# Patient Record
Sex: FEMALE | Race: WHITE | HISPANIC OR LATINO | Employment: UNEMPLOYED | URBAN - METROPOLITAN AREA
[De-identification: names, ages, dates, MRNs, and addresses within clinical notes are randomized per-mention and may not be internally consistent; named-entity substitution may affect disease eponyms.]

---

## 2024-08-28 ENCOUNTER — OFFICE VISIT (OUTPATIENT)
Dept: FAMILY MEDICINE | Facility: CLINIC | Age: 29
End: 2024-08-28
Payer: COMMERCIAL

## 2024-08-28 VITALS
HEART RATE: 77 BPM | BODY MASS INDEX: 32.94 KG/M2 | DIASTOLIC BLOOD PRESSURE: 87 MMHG | SYSTOLIC BLOOD PRESSURE: 124 MMHG | HEIGHT: 62 IN | WEIGHT: 179 LBS | RESPIRATION RATE: 22 BRPM | TEMPERATURE: 97.4 F | OXYGEN SATURATION: 98 %

## 2024-08-28 DIAGNOSIS — Z30.09 COUNSELING FOR BIRTH CONTROL REGARDING INTRAUTERINE DEVICE (IUD): Primary | ICD-10-CM

## 2024-08-28 PROCEDURE — 99203 OFFICE O/P NEW LOW 30 MIN: CPT | Performed by: FAMILY MEDICINE

## 2024-08-28 NOTE — PROGRESS NOTES
"  Assessment & Plan     Counseling for birth control regarding intrauterine device (IUD)  Patient will return for placement of paragard after 6 weeks postpartum, plans not to resume intercourse until after placement          BMI  Estimated body mass index is 32.52 kg/m  as calculated from the following:    Height as of this encounter: 1.58 m (5' 2.21\").    Weight as of this encounter: 81.2 kg (179 lb).             No follow-ups on file.    Nicole Lara is a 29 year old, presenting for the following health issues:  Contraception (Interest in the copper one)      2024     3:47 PM   Additional Questions   Roomed by Dieudonne   Accompanied by ALBERTINA         2024    Information    services provided? Yes   Language Danish   Type of interpretation provided Face-to-face    name Isatu    ID ALBERTINA    Agency [employer]    phone number [phone]        HPI     Would like to have IUD, has had a paragard in the past.     Has had two kids, younger baby born [birth year], older son born in [facility] . Would like one more, but not for a while.                   Objective    /87   Pulse 77   Temp 97.4  F (36.3  C)   Resp 22   Ht 1.58 m (5' 2.21\")   Wt 81.2 kg (179 lb)   LMP  (LMP Unknown)   SpO2 98%   BMI 32.52 kg/m    Body mass index is 32.52 kg/m .  Physical Exam       GEN: Well appearing, no distress  HENT: Eomi, no conjunctival injection, normocephalic  PULM: Unlabored breathing  SKIN: No visible rash  PSYCH: Affect bright, appropriate to content    History reviewed. No pertinent past medical history.   Past Surgical History:   Procedure Laterality Date     SECTION  2024     Family History   Problem Relation Age of Onset    Hypertension Mother                  Signed Electronically by: Shira Wilkerson MD    "

## 2024-10-07 ENCOUNTER — OFFICE VISIT (OUTPATIENT)
Dept: FAMILY MEDICINE | Facility: CLINIC | Age: 29
End: 2024-10-07
Payer: COMMERCIAL

## 2024-10-07 VITALS
RESPIRATION RATE: 20 BRPM | DIASTOLIC BLOOD PRESSURE: 82 MMHG | TEMPERATURE: 98.4 F | SYSTOLIC BLOOD PRESSURE: 119 MMHG | HEIGHT: 61 IN | OXYGEN SATURATION: 98 % | HEART RATE: 82 BPM | BODY MASS INDEX: 35.68 KG/M2 | WEIGHT: 189 LBS

## 2024-10-07 DIAGNOSIS — Z11.3 SCREENING EXAMINATION FOR STI: ICD-10-CM

## 2024-10-07 DIAGNOSIS — Z30.430 ENCOUNTER FOR INSERTION OF INTRAUTERINE CONTRACEPTIVE DEVICE: Primary | ICD-10-CM

## 2024-10-07 LAB — HCG UR QL: NEGATIVE

## 2024-10-07 PROCEDURE — 90471 IMMUNIZATION ADMIN: CPT | Performed by: FAMILY MEDICINE

## 2024-10-07 PROCEDURE — 81025 URINE PREGNANCY TEST: CPT | Performed by: FAMILY MEDICINE

## 2024-10-07 PROCEDURE — 87491 CHLMYD TRACH DNA AMP PROBE: CPT | Performed by: FAMILY MEDICINE

## 2024-10-07 PROCEDURE — 99207 PR DROP WITH A PROCEDURE: CPT | Performed by: FAMILY MEDICINE

## 2024-10-07 PROCEDURE — 58300 INSERT INTRAUTERINE DEVICE: CPT | Performed by: FAMILY MEDICINE

## 2024-10-07 PROCEDURE — 90656 IIV3 VACC NO PRSV 0.5 ML IM: CPT | Performed by: FAMILY MEDICINE

## 2024-10-07 PROCEDURE — 87591 N.GONORRHOEAE DNA AMP PROB: CPT | Performed by: FAMILY MEDICINE

## 2024-10-07 RX ORDER — COPPER 313.4 MG/1
1 INTRAUTERINE DEVICE INTRAUTERINE ONCE
COMMUNITY

## 2024-10-07 RX ORDER — COPPER 313.4 MG/1
1 INTRAUTERINE DEVICE INTRAUTERINE ONCE
Status: COMPLETED
Start: 2024-10-07 | End: 2024-10-07

## 2024-10-07 RX ADMIN — COPPER 1 EACH: 313.4 INTRAUTERINE DEVICE INTRAUTERINE at 17:23

## 2024-10-07 NOTE — PROGRESS NOTES
"IUD Insertion:  CONSULT:    Is a pregnancy test required: No.  Was a consent obtained?  Yes    Subjective: Jane Rojas is a 29 year old  presents for IUD and desires Paragard type IUD.    Patient has been given the opportunity to ask questions about all forms of birth control, including all options appropriate for Jane Rojas. Discussed that no method of birth control, except abstinence is 100% effective against pregnancy or sexually transmitted infection.     Jane Rojas understands she may have the IUD removed at any time. IUD should be removed by a health care provider.    The entire insertion procedure was reviewed with the patient, including care after placement.    No LMP recorded (lmp unknown). Last sexual activity: Has not returned to sexual activity since her birth, she is 8 wk pp . No allergy to betadine or shellfish. Patient desires STD screening  hCG Urine Qualitative   Date Value Ref Range Status   10/07/2024 Negative Negative Final     Comment:     This test is for screening purposes.  Results should be interpreted along with the clinical picture.  Confirmation testing is available if warranted by ordering [id number], HCG Quantitative Pregnancy.         /82   Pulse 82   Temp 98.4  F (36.9  C)   Resp 20   Ht 1.537 m (5' 0.5\")   Wt 85.7 kg (189 lb)   LMP  (LMP Unknown)   SpO2 98%   BMI 36.30 kg/m      Pelvic Exam:   EG/BUS: normal genital architecture without lesions, erythema or abnormal secretions.   Vagina: moist, pink, rugae with physiologic discharge and secretions  Cervix: Multiparous parous no lesions and pink, moist, closed, without lesion or CMT  Uterus: anteverted position, mobile, no pain    PROCEDURE NOTE: -- IUD Insertion    Reason for Insertion: contraception    Premedicated with ibuprofen.  Under sterile technique, cervix was visualized with speculum and prepped with Betadine solution swab x 3. Tenaculum was placed for stability. The uterus was gently " straightened and sounded to 7.0 cm. The sound passed very easily. IUD prepared for placement, and IUD inserted according to 's instructions without difficulty or significant resitance, and deployed at the fundus. The strings were visualized and trimmed to 4.0 cm from the external os. Tenaculum was removed and hemostasis noted. Speculum removed.  Patient tolerated procedure well.    EBL: minimal    Complications: none    ASSESSMENT:     ICD-10-CM    1. Screening for HIV (human immunodeficiency virus)  Z11.4       2. Need for hepatitis C screening test  Z11.59       3. Cervical cancer screening  Z12.4       4. Contraceptive management  Z30.9 HCG qualitative urine     HCG qualitative urine      5. Encounter for insertion of intrauterine contraceptive device  Z30.430 paragard intrauterine copper IUD device 1 each     INSERTION INTRAUTERINE DEVICE             PLAN:    Given 's handouts, including when to have IUD removed, list of danger s/sx, side effects and follow up recommended. Encouraged condom use for prevention of STD. Back up contraception advised for 7 days if progestin method. Advised to call for any fever, for prolonged or severe pain or bleeding, abnormal vaginal discharge, or unable to palpate strings. She was advised to use pain medications (ibuprofen) as needed for mild to moderate pain. Advised to follow-up in clinic in 4-6 weeks for IUD string check if unable to find strings or as directed by provider.     Shira Wilkerson MD

## 2024-10-09 LAB
C TRACH DNA SPEC QL PROBE+SIG AMP: NEGATIVE
N GONORRHOEA DNA SPEC QL NAA+PROBE: NEGATIVE

## 2025-02-25 ENCOUNTER — APPOINTMENT (OUTPATIENT)
Dept: CT IMAGING | Facility: HOSPITAL | Age: 30
End: 2025-02-25
Attending: STUDENT IN AN ORGANIZED HEALTH CARE EDUCATION/TRAINING PROGRAM
Payer: COMMERCIAL

## 2025-02-25 ENCOUNTER — ANESTHESIA (OUTPATIENT)
Dept: SURGERY | Facility: HOSPITAL | Age: 30
End: 2025-02-25
Payer: COMMERCIAL

## 2025-02-25 ENCOUNTER — VIRTUAL VISIT (OUTPATIENT)
Dept: INTERPRETER SERVICES | Facility: CLINIC | Age: 30
End: 2025-02-25

## 2025-02-25 ENCOUNTER — ANESTHESIA EVENT (OUTPATIENT)
Dept: SURGERY | Facility: HOSPITAL | Age: 30
End: 2025-02-25
Payer: COMMERCIAL

## 2025-02-25 ENCOUNTER — HOSPITAL ENCOUNTER (EMERGENCY)
Facility: HOSPITAL | Age: 30
Discharge: HOME | End: 2025-02-25
Attending: STUDENT IN AN ORGANIZED HEALTH CARE EDUCATION/TRAINING PROGRAM
Payer: COMMERCIAL

## 2025-02-25 VITALS
HEART RATE: 74 BPM | BODY MASS INDEX: 41.54 KG/M2 | HEIGHT: 61 IN | SYSTOLIC BLOOD PRESSURE: 115 MMHG | DIASTOLIC BLOOD PRESSURE: 63 MMHG | WEIGHT: 220 LBS | RESPIRATION RATE: 14 BRPM | TEMPERATURE: 97.5 F | OXYGEN SATURATION: 96 %

## 2025-02-25 DIAGNOSIS — K35.30 ACUTE APPENDICITIS WITH LOCALIZED PERITONITIS, WITHOUT PERFORATION, ABSCESS, OR GANGRENE: Primary | ICD-10-CM

## 2025-02-25 LAB
ALBUMIN SERPL BCG-MCNC: 4.5 G/DL (ref 3.5–5.2)
ALP SERPL-CCNC: 132 U/L (ref 40–150)
ALT SERPL W P-5'-P-CCNC: 23 U/L (ref 0–50)
ANION GAP SERPL CALCULATED.3IONS-SCNC: 12 MMOL/L (ref 7–15)
AST SERPL W P-5'-P-CCNC: 21 U/L (ref 0–45)
BASOPHILS # BLD AUTO: 0 10E3/UL (ref 0–0.2)
BASOPHILS NFR BLD AUTO: 0 %
BILIRUB DIRECT SERPL-MCNC: 0.08 MG/DL (ref 0–0.3)
BILIRUB SERPL-MCNC: 0.3 MG/DL
BUN SERPL-MCNC: 10.3 MG/DL (ref 6–20)
CALCIUM SERPL-MCNC: 9.9 MG/DL (ref 8.8–10.4)
CHLORIDE SERPL-SCNC: 105 MMOL/L (ref 98–107)
CREAT SERPL-MCNC: 0.62 MG/DL (ref 0.51–0.95)
EGFRCR SERPLBLD CKD-EPI 2021: >90 ML/MIN/1.73M2
EOSINOPHIL # BLD AUTO: 0.1 10E3/UL (ref 0–0.7)
EOSINOPHIL NFR BLD AUTO: 1 %
ERYTHROCYTE [DISTWIDTH] IN BLOOD BY AUTOMATED COUNT: 14.1 % (ref 10–15)
GLUCOSE SERPL-MCNC: 120 MG/DL (ref 70–99)
HCG SERPL QL: NEGATIVE
HCO3 SERPL-SCNC: 25 MMOL/L (ref 22–29)
HCT VFR BLD AUTO: 42.5 % (ref 35–47)
HGB BLD-MCNC: 13.6 G/DL (ref 11.7–15.7)
HOLD SPECIMEN: NORMAL
IMM GRANULOCYTES # BLD: 0.1 10E3/UL
IMM GRANULOCYTES NFR BLD: 0 %
LIPASE SERPL-CCNC: 23 U/L (ref 13–60)
LYMPHOCYTES # BLD AUTO: 3.1 10E3/UL (ref 0.8–5.3)
LYMPHOCYTES NFR BLD AUTO: 25 %
MCH RBC QN AUTO: 26.1 PG (ref 26.5–33)
MCHC RBC AUTO-ENTMCNC: 32 G/DL (ref 31.5–36.5)
MCV RBC AUTO: 81 FL (ref 78–100)
MONOCYTES # BLD AUTO: 0.5 10E3/UL (ref 0–1.3)
MONOCYTES NFR BLD AUTO: 4 %
NEUTROPHILS # BLD AUTO: 8.7 10E3/UL (ref 1.6–8.3)
NEUTROPHILS NFR BLD AUTO: 70 %
NRBC # BLD AUTO: 0 10E3/UL
NRBC BLD AUTO-RTO: 0 /100
PLATELET # BLD AUTO: 325 10E3/UL (ref 150–450)
POTASSIUM SERPL-SCNC: 3.6 MMOL/L (ref 3.4–5.3)
PROT SERPL-MCNC: 7.7 G/DL (ref 6.4–8.3)
RBC # BLD AUTO: 5.22 10E6/UL (ref 3.8–5.2)
SODIUM SERPL-SCNC: 142 MMOL/L (ref 135–145)
WBC # BLD AUTO: 12.5 10E3/UL (ref 4–11)

## 2025-02-25 PROCEDURE — 250N000011 HC RX IP 250 OP 636: Performed by: SURGERY

## 2025-02-25 PROCEDURE — 99207 PR APP CREDIT; MD BILLING SHARED VISIT: CPT

## 2025-02-25 PROCEDURE — 250N000013 HC RX MED GY IP 250 OP 250 PS 637: Performed by: ANESTHESIOLOGY

## 2025-02-25 PROCEDURE — 99285 EMERGENCY DEPT VISIT HI MDM: CPT | Mod: 25

## 2025-02-25 PROCEDURE — T1013 SIGN LANG/ORAL INTERPRETER: HCPCS | Mod: U4,TEL,95

## 2025-02-25 PROCEDURE — 250N000011 HC RX IP 250 OP 636: Performed by: ANESTHESIOLOGY

## 2025-02-25 PROCEDURE — 250N000011 HC RX IP 250 OP 636: Performed by: STUDENT IN AN ORGANIZED HEALTH CARE EDUCATION/TRAINING PROGRAM

## 2025-02-25 PROCEDURE — 250N000025 HC SEVOFLURANE, PER MIN: Performed by: SURGERY

## 2025-02-25 PROCEDURE — 99204 OFFICE O/P NEW MOD 45 MIN: CPT | Mod: FS | Performed by: SURGERY

## 2025-02-25 PROCEDURE — 250N000009 HC RX 250: Performed by: REGISTERED NURSE

## 2025-02-25 PROCEDURE — 360N000076 HC SURGERY LEVEL 3, PER MIN: Performed by: SURGERY

## 2025-02-25 PROCEDURE — 250N000013 HC RX MED GY IP 250 OP 250 PS 637: Performed by: STUDENT IN AN ORGANIZED HEALTH CARE EDUCATION/TRAINING PROGRAM

## 2025-02-25 PROCEDURE — 258N000003 HC RX IP 258 OP 636: Performed by: REGISTERED NURSE

## 2025-02-25 PROCEDURE — 80053 COMPREHEN METABOLIC PANEL: CPT | Performed by: STUDENT IN AN ORGANIZED HEALTH CARE EDUCATION/TRAINING PROGRAM

## 2025-02-25 PROCEDURE — 82248 BILIRUBIN DIRECT: CPT | Performed by: STUDENT IN AN ORGANIZED HEALTH CARE EDUCATION/TRAINING PROGRAM

## 2025-02-25 PROCEDURE — 44970 LAPAROSCOPY APPENDECTOMY: CPT | Performed by: SURGERY

## 2025-02-25 PROCEDURE — 710N000009 HC RECOVERY PHASE 1, LEVEL 1, PER MIN: Performed by: SURGERY

## 2025-02-25 PROCEDURE — 250N000009 HC RX 250: Performed by: ANESTHESIOLOGY

## 2025-02-25 PROCEDURE — 999N000141 HC STATISTIC PRE-PROCEDURE NURSING ASSESSMENT: Performed by: SURGERY

## 2025-02-25 PROCEDURE — 83690 ASSAY OF LIPASE: CPT | Performed by: STUDENT IN AN ORGANIZED HEALTH CARE EDUCATION/TRAINING PROGRAM

## 2025-02-25 PROCEDURE — 88304 TISSUE EXAM BY PATHOLOGIST: CPT | Mod: TC | Performed by: SURGERY

## 2025-02-25 PROCEDURE — 250N000011 HC RX IP 250 OP 636: Performed by: REGISTERED NURSE

## 2025-02-25 PROCEDURE — 85014 HEMATOCRIT: CPT | Performed by: STUDENT IN AN ORGANIZED HEALTH CARE EDUCATION/TRAINING PROGRAM

## 2025-02-25 PROCEDURE — 258N000003 HC RX IP 258 OP 636: Performed by: ANESTHESIOLOGY

## 2025-02-25 PROCEDURE — 96365 THER/PROPH/DIAG IV INF INIT: CPT | Mod: 59

## 2025-02-25 PROCEDURE — 44970 LAPAROSCOPY APPENDECTOMY: CPT | Mod: AS

## 2025-02-25 PROCEDURE — 85004 AUTOMATED DIFF WBC COUNT: CPT | Performed by: STUDENT IN AN ORGANIZED HEALTH CARE EDUCATION/TRAINING PROGRAM

## 2025-02-25 PROCEDURE — 88305 TISSUE EXAM BY PATHOLOGIST: CPT | Mod: TC | Performed by: SURGERY

## 2025-02-25 PROCEDURE — 84703 CHORIONIC GONADOTROPIN ASSAY: CPT | Performed by: STUDENT IN AN ORGANIZED HEALTH CARE EDUCATION/TRAINING PROGRAM

## 2025-02-25 PROCEDURE — 710N000012 HC RECOVERY PHASE 2, PER MINUTE: Performed by: SURGERY

## 2025-02-25 PROCEDURE — 272N000001 HC OR GENERAL SUPPLY STERILE: Performed by: SURGERY

## 2025-02-25 PROCEDURE — 250N000013 HC RX MED GY IP 250 OP 250 PS 637: Performed by: REGISTERED NURSE

## 2025-02-25 PROCEDURE — 370N000017 HC ANESTHESIA TECHNICAL FEE, PER MIN: Performed by: SURGERY

## 2025-02-25 PROCEDURE — 88304 TISSUE EXAM BY PATHOLOGIST: CPT | Mod: 26 | Performed by: PATHOLOGY

## 2025-02-25 PROCEDURE — 96375 TX/PRO/DX INJ NEW DRUG ADDON: CPT

## 2025-02-25 PROCEDURE — 36415 COLL VENOUS BLD VENIPUNCTURE: CPT | Performed by: STUDENT IN AN ORGANIZED HEALTH CARE EDUCATION/TRAINING PROGRAM

## 2025-02-25 PROCEDURE — 74177 CT ABD & PELVIS W/CONTRAST: CPT

## 2025-02-25 PROCEDURE — 250N000011 HC RX IP 250 OP 636: Mod: JW | Performed by: SURGERY

## 2025-02-25 RX ORDER — ONDANSETRON 2 MG/ML
4 INJECTION INTRAMUSCULAR; INTRAVENOUS EVERY 30 MIN PRN
Status: DISCONTINUED | OUTPATIENT
Start: 2025-02-25 | End: 2025-02-25 | Stop reason: HOSPADM

## 2025-02-25 RX ORDER — DOCUSATE SODIUM 100 MG/1
100 CAPSULE, LIQUID FILLED ORAL 2 TIMES DAILY
Qty: 30 CAPSULE | Refills: 0 | Status: SHIPPED | OUTPATIENT
Start: 2025-02-25

## 2025-02-25 RX ORDER — HYDROCODONE BITARTRATE AND ACETAMINOPHEN 5; 325 MG/1; MG/1
1-2 TABLET ORAL EVERY 4 HOURS PRN
Qty: 10 TABLET | Refills: 0 | Status: SHIPPED | OUTPATIENT
Start: 2025-02-25

## 2025-02-25 RX ORDER — KETOROLAC TROMETHAMINE 30 MG/ML
INJECTION, SOLUTION INTRAMUSCULAR; INTRAVENOUS PRN
Status: DISCONTINUED | OUTPATIENT
Start: 2025-02-25 | End: 2025-02-25

## 2025-02-25 RX ORDER — HYDROMORPHONE HCL IN WATER/PF 6 MG/30 ML
0.4 PATIENT CONTROLLED ANALGESIA SYRINGE INTRAVENOUS EVERY 5 MIN PRN
Status: DISCONTINUED | OUTPATIENT
Start: 2025-02-25 | End: 2025-02-25 | Stop reason: HOSPADM

## 2025-02-25 RX ORDER — ONDANSETRON 2 MG/ML
INJECTION INTRAMUSCULAR; INTRAVENOUS PRN
Status: DISCONTINUED | OUTPATIENT
Start: 2025-02-25 | End: 2025-02-25

## 2025-02-25 RX ORDER — SODIUM CHLORIDE, SODIUM LACTATE, POTASSIUM CHLORIDE, CALCIUM CHLORIDE 600; 310; 30; 20 MG/100ML; MG/100ML; MG/100ML; MG/100ML
INJECTION, SOLUTION INTRAVENOUS CONTINUOUS
Status: DISCONTINUED | OUTPATIENT
Start: 2025-02-25 | End: 2025-02-25 | Stop reason: HOSPADM

## 2025-02-25 RX ORDER — IOPAMIDOL 755 MG/ML
90 INJECTION, SOLUTION INTRAVASCULAR ONCE
Status: COMPLETED | OUTPATIENT
Start: 2025-02-25 | End: 2025-02-25

## 2025-02-25 RX ORDER — HYDROMORPHONE HCL IN WATER/PF 6 MG/30 ML
0.2 PATIENT CONTROLLED ANALGESIA SYRINGE INTRAVENOUS EVERY 5 MIN PRN
Status: DISCONTINUED | OUTPATIENT
Start: 2025-02-25 | End: 2025-02-25 | Stop reason: HOSPADM

## 2025-02-25 RX ORDER — DEXAMETHASONE SODIUM PHOSPHATE 10 MG/ML
INJECTION, SOLUTION INTRAMUSCULAR; INTRAVENOUS PRN
Status: DISCONTINUED | OUTPATIENT
Start: 2025-02-25 | End: 2025-02-25

## 2025-02-25 RX ORDER — ONDANSETRON 4 MG/1
4 TABLET, ORALLY DISINTEGRATING ORAL EVERY 30 MIN PRN
Status: DISCONTINUED | OUTPATIENT
Start: 2025-02-25 | End: 2025-02-25 | Stop reason: HOSPADM

## 2025-02-25 RX ORDER — DEXMEDETOMIDINE HYDROCHLORIDE 4 UG/ML
INJECTION, SOLUTION INTRAVENOUS
Status: DISCONTINUED
Start: 2025-02-25 | End: 2025-02-25 | Stop reason: HOSPADM

## 2025-02-25 RX ORDER — HYDROCODONE BITARTRATE AND ACETAMINOPHEN 5; 325 MG/1; MG/1
1 TABLET ORAL
Status: DISCONTINUED | OUTPATIENT
Start: 2025-02-25 | End: 2025-02-25 | Stop reason: HOSPADM

## 2025-02-25 RX ORDER — OXYCODONE HYDROCHLORIDE 5 MG/1
10 TABLET ORAL
Status: DISCONTINUED | OUTPATIENT
Start: 2025-02-25 | End: 2025-02-25 | Stop reason: HOSPADM

## 2025-02-25 RX ORDER — LIDOCAINE 40 MG/G
CREAM TOPICAL
Status: DISCONTINUED | OUTPATIENT
Start: 2025-02-25 | End: 2025-02-25 | Stop reason: HOSPADM

## 2025-02-25 RX ORDER — CEFAZOLIN SODIUM/WATER 2 G/20 ML
2 SYRINGE (ML) INTRAVENOUS
Status: COMPLETED | OUTPATIENT
Start: 2025-02-25 | End: 2025-02-25

## 2025-02-25 RX ORDER — FENTANYL CITRATE 50 UG/ML
INJECTION, SOLUTION INTRAMUSCULAR; INTRAVENOUS PRN
Status: DISCONTINUED | OUTPATIENT
Start: 2025-02-25 | End: 2025-02-25

## 2025-02-25 RX ORDER — ALBUTEROL SULFATE 90 UG/1
INHALANT RESPIRATORY (INHALATION) PRN
Status: DISCONTINUED | OUTPATIENT
Start: 2025-02-25 | End: 2025-02-25

## 2025-02-25 RX ORDER — DEXAMETHASONE SODIUM PHOSPHATE 4 MG/ML
4 INJECTION, SOLUTION INTRA-ARTICULAR; INTRALESIONAL; INTRAMUSCULAR; INTRAVENOUS; SOFT TISSUE
Status: DISCONTINUED | OUTPATIENT
Start: 2025-02-25 | End: 2025-02-25 | Stop reason: HOSPADM

## 2025-02-25 RX ORDER — PROPOFOL 10 MG/ML
INJECTION, EMULSION INTRAVENOUS PRN
Status: DISCONTINUED | OUTPATIENT
Start: 2025-02-25 | End: 2025-02-25

## 2025-02-25 RX ORDER — NALOXONE HYDROCHLORIDE 0.4 MG/ML
0.1 INJECTION, SOLUTION INTRAMUSCULAR; INTRAVENOUS; SUBCUTANEOUS
Status: DISCONTINUED | OUTPATIENT
Start: 2025-02-25 | End: 2025-02-25 | Stop reason: HOSPADM

## 2025-02-25 RX ORDER — SODIUM CHLORIDE, SODIUM LACTATE, POTASSIUM CHLORIDE, CALCIUM CHLORIDE 600; 310; 30; 20 MG/100ML; MG/100ML; MG/100ML; MG/100ML
INJECTION, SOLUTION INTRAVENOUS CONTINUOUS PRN
Status: DISCONTINUED | OUTPATIENT
Start: 2025-02-25 | End: 2025-02-25

## 2025-02-25 RX ORDER — OXYCODONE HYDROCHLORIDE 5 MG/1
5 TABLET ORAL
Status: COMPLETED | OUTPATIENT
Start: 2025-02-25 | End: 2025-02-25

## 2025-02-25 RX ORDER — BUPIVACAINE HYDROCHLORIDE 2.5 MG/ML
INJECTION, SOLUTION EPIDURAL; INFILTRATION; INTRACAUDAL PRN
Status: DISCONTINUED | OUTPATIENT
Start: 2025-02-25 | End: 2025-02-25 | Stop reason: HOSPADM

## 2025-02-25 RX ORDER — PIPERACILLIN SODIUM, TAZOBACTAM SODIUM 3; .375 G/15ML; G/15ML
3.38 INJECTION, POWDER, LYOPHILIZED, FOR SOLUTION INTRAVENOUS ONCE
Status: COMPLETED | OUTPATIENT
Start: 2025-02-25 | End: 2025-02-25

## 2025-02-25 RX ORDER — LIDOCAINE HYDROCHLORIDE 10 MG/ML
INJECTION, SOLUTION INFILTRATION; PERINEURAL PRN
Status: DISCONTINUED | OUTPATIENT
Start: 2025-02-25 | End: 2025-02-25

## 2025-02-25 RX ORDER — CEFAZOLIN SODIUM/WATER 2 G/20 ML
2 SYRINGE (ML) INTRAVENOUS SEE ADMIN INSTRUCTIONS
Status: DISCONTINUED | OUTPATIENT
Start: 2025-02-25 | End: 2025-02-25 | Stop reason: HOSPADM

## 2025-02-25 RX ORDER — FENTANYL CITRATE 50 UG/ML
25 INJECTION, SOLUTION INTRAMUSCULAR; INTRAVENOUS EVERY 5 MIN PRN
Status: DISCONTINUED | OUTPATIENT
Start: 2025-02-25 | End: 2025-02-25 | Stop reason: HOSPADM

## 2025-02-25 RX ORDER — FENTANYL CITRATE 50 UG/ML
50 INJECTION, SOLUTION INTRAMUSCULAR; INTRAVENOUS EVERY 5 MIN PRN
Status: DISCONTINUED | OUTPATIENT
Start: 2025-02-25 | End: 2025-02-25 | Stop reason: HOSPADM

## 2025-02-25 RX ORDER — ACETAMINOPHEN 325 MG/1
975 TABLET ORAL ONCE
Status: COMPLETED | OUTPATIENT
Start: 2025-02-25 | End: 2025-02-25

## 2025-02-25 RX ADMIN — PROPOFOL 200 MG: 10 INJECTION, EMULSION INTRAVENOUS at 12:03

## 2025-02-25 RX ADMIN — Medication 2 G: at 12:00

## 2025-02-25 RX ADMIN — ACETAMINOPHEN 975 MG: 325 TABLET ORAL at 05:29

## 2025-02-25 RX ADMIN — FENTANYL CITRATE 50 MCG: 50 INJECTION, SOLUTION INTRAMUSCULAR; INTRAVENOUS at 13:46

## 2025-02-25 RX ADMIN — IOPAMIDOL 90 ML: 755 INJECTION, SOLUTION INTRAVENOUS at 06:01

## 2025-02-25 RX ADMIN — FENTANYL CITRATE 100 MCG: 50 INJECTION, SOLUTION INTRAMUSCULAR; INTRAVENOUS at 12:03

## 2025-02-25 RX ADMIN — ROCURONIUM 35 MG: 50 INJECTION, SOLUTION INTRAVENOUS at 12:10

## 2025-02-25 RX ADMIN — FENTANYL CITRATE 50 MCG: 50 INJECTION, SOLUTION INTRAMUSCULAR; INTRAVENOUS at 12:53

## 2025-02-25 RX ADMIN — FENTANYL CITRATE 50 MCG: 50 INJECTION, SOLUTION INTRAMUSCULAR; INTRAVENOUS at 12:22

## 2025-02-25 RX ADMIN — KETOROLAC TROMETHAMINE 30 MG: 30 INJECTION, SOLUTION INTRAMUSCULAR at 12:49

## 2025-02-25 RX ADMIN — OXYCODONE HYDROCHLORIDE 5 MG: 5 TABLET ORAL at 14:14

## 2025-02-25 RX ADMIN — SODIUM CHLORIDE, POTASSIUM CHLORIDE, SODIUM LACTATE AND CALCIUM CHLORIDE: 600; 310; 30; 20 INJECTION, SOLUTION INTRAVENOUS at 11:55

## 2025-02-25 RX ADMIN — FENTANYL CITRATE 50 MCG: 50 INJECTION, SOLUTION INTRAMUSCULAR; INTRAVENOUS at 13:30

## 2025-02-25 RX ADMIN — ONDANSETRON 4 MG: 2 INJECTION INTRAMUSCULAR; INTRAVENOUS at 12:20

## 2025-02-25 RX ADMIN — LIDOCAINE HYDROCHLORIDE 5 ML: 10 INJECTION, SOLUTION INFILTRATION; PERINEURAL at 12:03

## 2025-02-25 RX ADMIN — FAMOTIDINE 20 MG: 10 INJECTION, SOLUTION INTRAVENOUS at 05:30

## 2025-02-25 RX ADMIN — MIDAZOLAM HYDROCHLORIDE 2 MG: 1 INJECTION, SOLUTION INTRAMUSCULAR; INTRAVENOUS at 11:55

## 2025-02-25 RX ADMIN — ALBUTEROL SULFATE 6 PUFF: 90 AEROSOL, METERED RESPIRATORY (INHALATION) at 12:45

## 2025-02-25 RX ADMIN — FENTANYL CITRATE 50 MCG: 50 INJECTION, SOLUTION INTRAMUSCULAR; INTRAVENOUS at 12:37

## 2025-02-25 RX ADMIN — SUGAMMADEX 200 MG: 100 INJECTION, SOLUTION INTRAVENOUS at 12:50

## 2025-02-25 RX ADMIN — DEXAMETHASONE SODIUM PHOSPHATE 10 MG: 10 INJECTION, SOLUTION INTRAMUSCULAR; INTRAVENOUS at 12:15

## 2025-02-25 RX ADMIN — ONDANSETRON 4 MG: 2 INJECTION, SOLUTION INTRAMUSCULAR; INTRAVENOUS at 15:01

## 2025-02-25 RX ADMIN — PIPERACILLIN AND TAZOBACTAM 3.38 G: 3; .375 INJECTION, POWDER, FOR SOLUTION INTRAVENOUS at 06:36

## 2025-02-25 RX ADMIN — ROCURONIUM 5 MG: 50 INJECTION, SOLUTION INTRAVENOUS at 12:03

## 2025-02-25 RX ADMIN — DEXMEDETOMIDINE HYDROCHLORIDE 8 MCG: 100 INJECTION, SOLUTION INTRAVENOUS at 13:09

## 2025-02-25 RX ADMIN — Medication 100 MG: at 12:03

## 2025-02-25 ASSESSMENT — ACTIVITIES OF DAILY LIVING (ADL)
ADLS_ACUITY_SCORE: 41

## 2025-02-25 ASSESSMENT — COLUMBIA-SUICIDE SEVERITY RATING SCALE - C-SSRS
2. HAVE YOU ACTUALLY HAD ANY THOUGHTS OF KILLING YOURSELF IN THE PAST MONTH?: NO
6. HAVE YOU EVER DONE ANYTHING, STARTED TO DO ANYTHING, OR PREPARED TO DO ANYTHING TO END YOUR LIFE?: NO
1. IN THE PAST MONTH, HAVE YOU WISHED YOU WERE DEAD OR WISHED YOU COULD GO TO SLEEP AND NOT WAKE UP?: NO

## 2025-02-25 NOTE — ANESTHESIA POSTPROCEDURE EVALUATION
Patient: Jane Rojas    Procedure: Procedure(s):  APPENDECTOMY, LAPAROSCOPIC, REMOVAL OF MIRENA IUD       Anesthesia Type:  General    Note:  Disposition: Outpatient   Postop Pain Control: Uneventful            Sign Out: Well controlled pain   PONV: No   Neuro/Psych: Uneventful            Sign Out: Acceptable/Baseline neuro status   Airway/Respiratory: Uneventful            Sign Out: Acceptable/Baseline resp. status   CV/Hemodynamics: Uneventful            Sign Out: Acceptable CV status; No obvious hypovolemia; No obvious fluid overload   Other NRE: NONE   DID A NON-ROUTINE EVENT OCCUR? No           Last vitals:  Vitals Value Taken Time   /58 02/25/25 1430   Temp 36.5  C (97.7  F) 02/25/25 1400   Pulse 78 02/25/25 1445   Resp 21 02/25/25 1441   SpO2 96 % 02/25/25 1445   Vitals shown include unfiled device data.    Electronically Signed By: Velasquez Cardoso MD  February 25, 2025  5:10 PM

## 2025-02-25 NOTE — OP NOTE
Name:  Jane Rojas  PCP:  Shira Wilkerson  Procedure Date:  2/25/2025      Procedure(s):  APPENDECTOMY, LAPAROSCOPIC, REMOVAL OF MIRENA IUD    Pre-Procedure Diagnosis:  Acute appendicitis with localized peritonitis, without perforation, abscess, or gangrene [K35.30]     Post-Procedure Diagnosis:    Appendicitis  Free-floating IUD    Surgeon(s):  Sue Borrero PA Borut, Jeffrey James, DO    Circulator: Cecile Lazar RN; Denice Kennedy RN  Scrub Person: Adrian Ruth; Emmie Francois    Anesthesia Type:  GET      Findings:  Acute appendicitis  IUD attached to adhesion in the anterior abdominal wall peritoneum    Operative Report:    The patient was taken to the operating room and placed in a supine position.  SCDs were placed on bilateral lower extremities.  Antibiotics were given prior to skin incision.  The abdomen was prepped and draped in a sterile fashion.  Our PA Sue Borrero was present the entire case and instrumental driving the camera and closure.    I began the first portion of the procedure by injecting quarter percent Marcaine with epinephrine into the infraumbilical position.  I then made a 5 mm transverse incision below the umbilicus and established a pneumoperitoneum using a Veress needle technique.  Once the pneumoperitoneum was established,I placed a 5 mm trocar with a 5 mm 30  camera into the abdomen.  The surrounding structures were scrutinized for any injuries upon entry.  I did not find any. I placed an 12 mm trocar in the left lower quadrant position as well as 1 suprapubic 5 mm trochars under direct visualization.  All trochars were placed after local anesthetic was injected to the fascial layers.    I first encountered some adhesions in the anterior abdominal wall.  There was evidence of an IUD adhered to the peritoneum.  The adhesions were taken down with electrocautery and sharp dissection.  I then turned my attention to the appendix.  I then  manipulated the large and small bowel to gain access to the appendix.  Once found, I made a window at the base of the appendix and then fired a 45 mm blue load stapler across the base the mesoappendix.  I then fired an additional 45 mm white load stapler across the mesoappendix.  I bolster this with additional 10 mm clips to ensure hemostasis at the staple line.  I then placed the appendix in an 10 mm Endo Catch bag and brought out the left lower quadrant incision.  I then addressed the free-floating IUD that was adhered into the omentum.  This was removed with electrocautery and blunt dissection.  I then brought it out through the 12 mm trocar site.  Additional 10 mm clips were used to come across small omental blood vessels where the IUD was adhered to.  Next I closed the 12 mm fascial defect with an 0 Vicryl suture using a Lonnie Werner needle technique.  I removed all trochars and deflated the abdomen.  I then closed all skin edges with 4-0 Monocryl subcuticular sutures.  The wounds were then cleaned and covered with a dry, sterile dressing.  All sponge counts and needle counts were correct at the end of the procedure.    Estimated Blood Loss:   10 cc    Specimens:    ID Type Source Tests Collected by Time Destination   1 : appendix Tissue Appendix SURGICAL PATHOLOGY EXAM Carroll Carver DO 2/25/2025 12:34 PM    2 : mirena iud Foreign Body Other SURGICAL PATHOLOGY EXAM Carroll Carver DO 2/25/2025 12:35 PM           Drains:        Complications:    None    Carroll Carver DO

## 2025-02-25 NOTE — ANESTHESIA CARE TRANSFER NOTE
Patient: Jane Rojas    Procedure: Procedure(s):  APPENDECTOMY, LAPAROSCOPIC, REMOVAL OF MIRENA IUD       Diagnosis: Acute appendicitis with localized peritonitis, without perforation, abscess, or gangrene [K35.30]  Diagnosis Additional Information: No value filed.    Anesthesia Type:   General     Note:    Oropharynx: oropharynx clear of all foreign objects and spontaneously breathing  Level of Consciousness: drowsy  Oxygen Supplementation: face mask  Level of Supplemental Oxygen (L/min / FiO2): 5  Independent Airway: airway patency satisfactory and stable  Dentition: dentition unchanged  Vital Signs Stable: post-procedure vital signs reviewed and stable  Report to RN Given: handoff report given  Patient transferred to: PACU    Handoff Report: Identifed the Patient, Identified the Reponsible Provider, Reviewed the pertinent medical history, Discussed the surgical course, Reviewed Intra-OP anesthesia mangement and issues during anesthesia, Set expectations for post-procedure period and Allowed opportunity for questions and acknowledgement of understanding      Vitals:  Vitals Value Taken Time   /60 02/25/25 1318   Temp 33  C (91.4  F) 02/25/25 1326   Pulse 87 02/25/25 1326   Resp 17 02/25/25 1326   SpO2 96 % 02/25/25 1326   Vitals shown include unfiled device data.    Electronically Signed By: IZA Parker CRNA  February 25, 2025  1:27 PM

## 2025-02-25 NOTE — LETTER
[facility]  [address] [place] [id number]  Phone: [phone]  Fax: [phone]    February 25, 2025        Connor Owenalejandrareynold Rojas    To whom it may concern:    RE: Jane Ramírez    Patient was at our medical facility on 02/25/2025 on behalf of his significant other, Jane. 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     Please contact me for questions or concerns.      Sincerely,    Sue Borrero PA-C

## 2025-02-25 NOTE — ED TRIAGE NOTES
Mid ABD pain for 1-22 days. Denies N/V/D.  States she had an IUD placed, and wonders if this is causing her pain. No interventions at home. Denies pregnancy.     Triage Assessment (Adult)       Row Name 02/25/25 0439          Triage Assessment    Airway WDL WDL        Respiratory WDL    Respiratory WDL WDL        Skin Circulation/Temperature WDL    Skin Circulation/Temperature WDL WDL        Cardiac WDL    Cardiac WDL WDL        Peripheral/Neurovascular WDL    Peripheral Neurovascular WDL WDL        Cognitive/Neuro/Behavioral WDL    Cognitive/Neuro/Behavioral WDL WDL

## 2025-02-25 NOTE — ANESTHESIA PROCEDURE NOTES
Airway       Patient location during procedure: OR       Procedure Start/Stop Times: 2/25/2025 12:05 PM  Staff -        CRNA: Emeli Mattson APRN CRNA       Performed By: CRNA  Consent for Airway        Urgency: elective  Indications and Patient Condition       Indications for airway management: rose mary-procedural       Induction type:intravenous       Mask difficulty assessment: 0 - not attempted    Final Airway Details       Final airway type: endotracheal airway       Successful airway: ETT - single and Oral  Endotracheal Airway Details        ETT size (mm): 7.0       Cuffed: yes       Successful intubation technique: direct laryngoscopy       DL Blade Type: Carlson 2       Grade View of Cords: 1       Adjucts: stylet       Position: Right       Measured from: lips       Secured at (cm): 21       Bite block used: None    Post intubation assessment        Placement verified by: capnometry, equal breath sounds and chest rise        Number of attempts at approach: 1       Number of other approaches attempted: 0       Secured with: commercial tube vasquez and tape       Ease of procedure: easy       Dentition: Intact    Medication(s) Administered   Medication Administration Time: 2/25/2025 12:05 PM

## 2025-02-25 NOTE — ANESTHESIA PREPROCEDURE EVALUATION
"Anesthesia Pre-Procedure Evaluation    Patient: Jane Rojas   MRN: [id number] : 1995        Procedure : Procedure(s):  APPENDECTOMY, LAPAROSCOPIC          History reviewed. No pertinent past medical history.   Past Surgical History:   Procedure Laterality Date     SECTION  2024      No Known Allergies   Social History     Tobacco Use    Smoking status: Never     Passive exposure: Never    Smokeless tobacco: Never   Substance Use Topics    Alcohol use: Not on file      Wt Readings from Last 1 Encounters:   25 99.8 kg (220 lb)           Physical Exam    Airway        Mallampati: II   TM distance: > 3 FB   Neck ROM: full   Mouth opening: > 3 cm    Respiratory Devices and Support         Dental     Comment: wnl        Cardiovascular          Rhythm and rate: regular and normal     Pulmonary           breath sounds clear to auscultation           OUTSIDE LABS:  CBC:   Lab Results   Component Value Date    WBC 12.5 (H) 2025    HGB 13.6 2025    HCT 42.5 2025     2025     BMP:   Lab Results   Component Value Date     2025    POTASSIUM 3.6 2025    CHLORIDE 105 2025    CO2 25 2025    BUN 10.3 2025    CR 0.62 2025     (H) 2025     COAGS: No results found for: \"PTT\", \"INR\", \"FIBR\"  POC:   Lab Results   Component Value Date    HCG Negative 10/07/2024    HCGS Negative 2025     HEPATIC:   Lab Results   Component Value Date    ALBUMIN 4.5 2025    PROTTOTAL 7.7 2025    ALT 23 2025    AST 21 2025    ALKPHOS 132 2025    BILITOTAL 0.3 2025     OTHER:   Lab Results   Component Value Date    LIANA 9.9 2025    LIPASE 23 2025       Anesthesia Plan    ASA Status:  2, emergent    NPO Status:  NPO Appropriate    Anesthesia Type: General.     - Airway: ETT   Induction: Intravenous, RSI.   Maintenance: Balanced.        Consents    Anesthesia Plan(s) and associated risks, " "benefits, and realistic alternatives discussed. Questions answered and patient/representative(s) expressed understanding.     - Discussed:     - Discussed with:  Patient,       - Extended Intubation/Ventilatory Support Discussed: No.      - Patient is DNR/DNI Status: No     Use of blood products discussed: No .     Postoperative Care    Pain management: IV analgesics, Multi-modal analgesia.   PONV prophylaxis: Ondansetron (or other 5HT-3), Dexamethasone or Solumedrol     Comments:    Other Comments: NPO. Acute appendicitis. Had  last year and did well. No recent URI.           Tessie Roberts MD    I have reviewed the pertinent notes and labs in the chart from the past 30 days and (re)examined the patient.  Any updates or changes from those notes are reflected in this note.    Clinically Significant Risk Factors Present on Admission                             # Severe Obesity: Estimated body mass index is 41.54 kg/m  as calculated from the following:    Height as of this encounter: 1.55 m (5' 1.02\").    Weight as of this encounter: 99.8 kg (220 lb).                "

## 2025-02-25 NOTE — ED PROVIDER NOTES
EMERGENCY DEPARTMENT ENCOUNTER      NAME: Jane Rojas  AGE: 29 year old female  YOB: 1995  MRN: [id number]  EVALUATION DATE & TIME: 2025  4:43 AM    PCP: Shira Wilkerson    ED PROVIDER: Mandeep Miller MD      Chief Complaint   Patient presents with    Abdominal Pain         FINAL IMPRESSION:  1. Acute appendicitis with localized peritonitis, without perforation, abscess, or gangrene          ED COURSE & MEDICAL DECISION MAKING:    Pertinent Labs & Imaging studies reviewed. (See chart for details)  29 year old female presents to the Emergency Department for evaluation of abd pain    ED Course as of 25 06   0455 I met with the patient to obtain patient history and performed a physical exam. Discussed plan for ED work up including potential diagnostic studies and interventions.   0505 Pt is a 29yoF who presents to the ER with abd pain for the past 5 hours. It migrates and has been constant. Only prior abd surgery is  6 months ago. She had an IUD placed at that time. No vaginal symptoms or bowel symptoms. No urinary symptoms. She has epigastric, RUQ, and LLQ tenderness. No peritonitis. Ddx includes pancreatitis, biliary pathology, migrated IUD, colitis, gastritis, PUD, constipation, pregnancy.   0523 Pregnancy test negative.   0523 Mild leukocytosis of 12.5.  No anemia.   0540 No electrolyte abnormalities or kidney injury.  No transaminitis or pancreatitis.   0620 Radiology called to report that the patient has acute appendicitis.  General surgery paged.   6668 Dr. Carver recommends patient stay in ED until they can take her to OR, where she will discharge from PACU later.     Zosyn ordered. NPO.    Patient signed out to oncoming provider with the following to do:  - continue to monitor until surgery takes to OR    Medical Decision Making  Obtained supplemental history:Supplemental history obtained?: No  Reviewed external records: External records  reviewed?: Documented in chart  Care impacted by chronic illness:Documented in Chart  Care significantly affected by social determinants of health:Access to Medical Care  Did you consider but not order tests?: Work up considered but not performed and documented in chart, if applicable  Did you interpret images independently?: Independent interpretation of ECG and images noted in documentation, when applicable.  Consultation discussion with other provider:Did you involve another provider (consultant, , pharmacy, etc.)?: I discussed the care with another health care provider, see documentation for details.  Admission considered. Patient was signed out to the oncoming physician, disposition pending.  Not Applicable      At the conclusion of the encounter I discussed the results of all of the tests and the disposition. The questions were answered. The patient or family acknowledged understanding and was agreeable with the care plan.     0 minutes of critical care time     MEDICATIONS GIVEN IN THE EMERGENCY:  Medications   piperacillin-tazobactam (ZOSYN) 3.375 g vial to attach to  mL bag (has no administration in time range)   acetaminophen (TYLENOL) tablet 975 mg (975 mg Oral $Given 2/25/25 0529)   famotidine (PEPCID) injection 20 mg (20 mg Intravenous $Given 2/25/25 0530)   iopamidol (ISOVUE-370) solution 90 mL (90 mLs Intravenous $Given 2/25/25 0601)       NEW PRESCRIPTIONS STARTED AT TODAY'S ER VISIT  New Prescriptions    No medications on file          =================================================================    HPI    Patient information was obtained from: Patient    Use of : Yes (MARTII) - Language Cook Islander        Jane Rojas is a 29 year old female with no documented pertinent history who presents to this ED via private car with  for evaluation of abdominal pain.    Patient reports constant epigastric abdominal pain that radiates to her back since midnight. She had an IUD  placed 6 months ago, after giving birth, and believes that might be the cause of her pain. She was at the hospital for her baby when the pain started. The pain started slow before gradually worsening. Patient has a history of a . Her last menstrual period was 2025. Patient denies dysuria, hematuria, nausea, vomiting, diarrhea, constipation, and abnormal vaginal bleeding or discharge.      PAST MEDICAL HISTORY:  History reviewed. No pertinent past medical history.    PAST SURGICAL HISTORY:  Past Surgical History:   Procedure Laterality Date     SECTION  2024           CURRENT MEDICATIONS:    paragard intrauterine copper device        ALLERGIES:  No Known Allergies    FAMILY HISTORY:  Family History   Problem Relation Age of Onset    Hypertension Mother        SOCIAL HISTORY:   Social History     Socioeconomic History    Marital status:    Tobacco Use    Smoking status: Never     Passive exposure: Never    Smokeless tobacco: Never   Social History Narrative    From [place], in [place] since . Lives with kids and her -his family is here but hers is not.      Social Drivers of Health     Financial Resource Strain: Not on File (10/11/2024)    Received from [facility]    Financial Resource Strain     Financial Resource Strain: 0   Food Insecurity: Not on File (10/11/2024)    Received from [facility]    Food Insecurity     Food: 0   Transportation Needs: Not on File (10/11/2024)    Received from [facility]    Transportation Needs     Transportation: 0   Physical Activity: Not on File (10/11/2024)    Received from [facility] [employer]    Physical Activity     Physical Activity: 0   Stress: Not on File (10/11/2024)    Received from [facility]    Stress     Stress: 0   Social Connections: Not on File (10/11/2024)    Received from [facility]    Social Connections     Connectedness: 0   Interpersonal Safety: Low Risk  (2024)    Interpersonal Safety     Do you feel physically and emotionally safe where you currently  "live?: Yes     Within the past 12 months, have you been hit, slapped, kicked or otherwise physically hurt by someone?: No     Within the past 12 months, have you been humiliated or emotionally abused in other ways by your partner or ex-partner?: No   Housing Stability: Not on File (10/11/2024)    Received from [facility]    BovControl Stability     Housin       VITALS:  /77   Pulse 64   Temp 97.3  F (36.3  C) (Temporal)   Resp 16   Ht 1.55 m (5' 1.02\")   Wt 99.8 kg (220 lb)   LMP  (LMP Unknown)   SpO2 98%   BMI 41.54 kg/m      PHYSICAL EXAM    Physical Exam  Vitals and nursing note reviewed.   Constitutional:       General: She is not in acute distress.     Appearance: Normal appearance. She is normal weight. She is not ill-appearing.   HENT:      Head: Normocephalic and atraumatic.      Nose: Nose normal.      Mouth/Throat:      Mouth: Mucous membranes are moist.      Pharynx: Oropharynx is clear.   Eyes:      Extraocular Movements: Extraocular movements intact.      Conjunctiva/sclera: Conjunctivae normal.   Cardiovascular:      Rate and Rhythm: Normal rate and regular rhythm.      Pulses: Normal pulses.      Heart sounds: Normal heart sounds. No murmur heard.  Pulmonary:      Effort: Pulmonary effort is normal. No respiratory distress.      Breath sounds: Normal breath sounds.   Abdominal:      General: Abdomen is flat. There is no distension.      Palpations: Abdomen is soft.      Tenderness: There is abdominal tenderness (epigastric, RUQ, LLQ). There is no right CVA tenderness, left CVA tenderness, guarding or rebound.   Musculoskeletal:         General: Normal range of motion.      Cervical back: Normal range of motion.      Right lower leg: No edema.      Left lower leg: No edema.   Skin:     General: Skin is warm and dry.      Capillary Refill: Capillary refill takes less than 2 seconds.      Coloration: Skin is not jaundiced or pale.   Neurological:      General: No focal deficit present.      " Mental Status: She is alert and oriented to person, place, and time. Mental status is at baseline.   Psychiatric:         Mood and Affect: Mood normal.         Behavior: Behavior normal.         Thought Content: Thought content normal.         Judgment: Judgment normal.            LAB:  All pertinent labs reviewed and interpreted.  Results for orders placed or performed during the hospital encounter of 02/25/25   CT Abdomen Pelvis w Contrast    Impression    IMPRESSION:   Acute uncomplicated appendicitis.    I discussed the findings with Dr. Miller of the ED at 6:20 AM on 2/25/2025.   Extra Blue Top Tube   Result Value Ref Range    Hold Specimen JIC    Extra Red Top Tube   Result Value Ref Range    Hold Specimen JIC    Extra Green Top (Lithium Heparin) Tube   Result Value Ref Range    Hold Specimen JIC    Extra Purple Top Tube   Result Value Ref Range    Hold Specimen JIC    Basic metabolic panel   Result Value Ref Range    Sodium 142 135 - 145 mmol/L    Potassium 3.6 3.4 - 5.3 mmol/L    Chloride 105 98 - 107 mmol/L    Carbon Dioxide (CO2) 25 22 - 29 mmol/L    Anion Gap 12 7 - 15 mmol/L    Urea Nitrogen 10.3 6.0 - 20.0 mg/dL    Creatinine 0.62 0.51 - 0.95 mg/dL    GFR Estimate >90 >60 mL/min/1.73m2    Calcium 9.9 8.8 - 10.4 mg/dL    Glucose 120 (H) 70 - 99 mg/dL   Hepatic function panel   Result Value Ref Range    Protein Total 7.7 6.4 - 8.3 g/dL    Albumin 4.5 3.5 - 5.2 g/dL    Bilirubin Total 0.3 <=1.2 mg/dL    Alkaline Phosphatase 132 40 - 150 U/L    AST 21 0 - 45 U/L    ALT 23 0 - 50 U/L    Bilirubin Direct 0.08 0.00 - 0.30 mg/dL   Result Value Ref Range    Lipase 23 13 - 60 U/L   HCG QUALitative pregnancy (blood)   Result Value Ref Range    hCG Serum Qualitative Negative Negative   CBC with platelets and differential   Result Value Ref Range    WBC Count 12.5 (H) 4.0 - 11.0 10e3/uL    RBC Count 5.22 (H) 3.80 - 5.20 10e6/uL    Hemoglobin 13.6 11.7 - 15.7 g/dL    Hematocrit 42.5 35.0 - 47.0 %    MCV 81 78 - 100  fL    MCH 26.1 (L) 26.5 - 33.0 pg    MCHC 32.0 31.5 - 36.5 g/dL    RDW 14.1 10.0 - 15.0 %    Platelet Count 325 150 - 450 10e3/uL    % Neutrophils 70 %    % Lymphocytes 25 %    % Monocytes 4 %    % Eosinophils 1 %    % Basophils 0 %    % Immature Granulocytes 0 %    NRBCs per 100 WBC 0 <1 /100    Absolute Neutrophils 8.7 (H) 1.6 - 8.3 10e3/uL    Absolute Lymphocytes 3.1 0.8 - 5.3 10e3/uL    Absolute Monocytes 0.5 0.0 - 1.3 10e3/uL    Absolute Eosinophils 0.1 0.0 - 0.7 10e3/uL    Absolute Basophils 0.0 0.0 - 0.2 10e3/uL    Absolute Immature Granulocytes 0.1 <=0.4 10e3/uL    Absolute NRBCs 0.0 10e3/uL       RADIOLOGY:  Reviewed all pertinent imaging. Please see official radiology report.  CT Abdomen Pelvis w Contrast   Final Result   IMPRESSION:    Acute uncomplicated appendicitis.      I discussed the findings with Dr. Miller of the ED at 6:20 AM on 2/25/2025.          PROCEDURES:   None      [facility] Document[facility]:   CMS Diagnoses:              I, Jessica Walker, am serving as a scribe to document services personally performed by Mandeep Miller MD based on my observation and the provider's statements to me. I, Mandeep Miller MD, attest that Jessica Walker is acting in a scribe capacity, has observed my performance of the services and has documented them in accordance with my direction.    Mandeep Miller MD  [facility] EMERGENCY DEPARTMENT  [address] [place] [id number]  [phone]     Mandeep Miller MD  02/25/25 0683

## 2025-02-25 NOTE — H&P
General Surgery Consultation  Jane Rojas MRN# [id number]   Age/Sex: 29 year old female YOB: 1995     Reason for consult: 1. Acute appendicitis with localized peritonitis, without perforation, abscess, or gangrene            Requesting physician: Mandeep Miller MD                   Assessment and Plan:   Assessment:  Jane Rojas is a 29yoF with PMHx of  2024 and IUD insertion 10/2024 who presents d/t acute epigastric abdominal pain that started at 1230 am overnight associated with fever/chills sensation and pain re-localization to her RLQ as of this morning.     Labs notable for leukocytosis (12.5), hCG negative. CT revealing acute uncomplicated appendicitis without abscess or perforation that had been further notable for extrauterine positioning of IUD in the anterior left pelvis without adjacent fluid or significant inflammation about the IUD. On exam, she is tender in her RLQ with associated involuntary guarding c/f + McBurney's point. Plan for laparoscopic appendectomy today for which she is amenable to proceed with.     Plan:  -NPO diet  -MIVF  -Multimodal pain control  -Plan for laparoscopic appendectomy for which she is amenable to proceed with. Will further evaluate IUD intraoperatively         Physician Attestation     I saw and evaluated Jane Rojas as part of a shared APRN/PA visit.     I personally reviewed the vital signs, medications, labs, and imaging.    I personally provided a substantive portion of care for this patient and I approve the care plan as written by the KANDIS.  I was involved with Medical Decision Making includin day history of abdominal pain started in the periumbilical region and migrated the right lower quadrant.  No fever chills nausea vomiting.  CT imaging which demonstrated acute appendicitis.  Also demonstrated a free-floating IUD.  Patient states she was supposed to schedule an appointment with her OB/GYN in the future but  has not done so for quite some time.  Denies any other abdominal pain left sided abdominal region.  Abdomen -soft, tender palpation right lower quadrant  CT scan demonstrates acute appendicitis with free-floating IUD in the left lower quadrant  Assessment/plan acute appendicitis with abdominal  IUD  -The pathophysiology of appendicitis was explained to the patient.  The risks and benefits of surgery versus nonoperative management strategy was discussed as well.  The plan will be for laparoscopic appendectomy.  During that time we will evaluate the left lower quadrant to see if the IUD is easily removable.  If it is we will remove it.  If not we will leave it in place and have her follow-up with her GYN physician for removal.  Carroll Carver,   Date of Service (when I saw the patient): 25            Chief Complaint:     Chief Complaint   Patient presents with    Abdominal Pain        History is obtained from the patient and EMR    HPI:   Jane Rojas is a 29 year old female with PMHx of  2024 and IUD insertion 10/2024 who presents d/t acute epigastric abdominal pain that started at 1230 AM overnight. She reports pain has been associated with fever/chills sensation prompting ED presentation. Pain has re-localized to her RLQ as of this morning, exacerbated with movement and walking. Denies associated nausea, vomiting, diarrhea, constipation. Denies notable preceding events. Surgical hx of  in 2024. Per chart review and patient endorsement had para guard IUD insertion 10/2024. Had an ultrasound in 2024 with confirmation of proper IUD position. Denies current medications. Last oral intake reported yesterday evening for dinner at 6 pm aside from sips of water with tylenol this morning.           Past Medical History:   History reviewed. No pertinent past medical history.           Past Surgical History:     Past Surgical History:   Procedure Laterality Date     SECTION   "08/04/2024             Social History:    reports that she has never smoked. She has never been exposed to tobacco smoke. She has never used smokeless tobacco.           Family History:     Family History   Problem Relation Age of Onset    Hypertension Mother               Allergies:   No Known Allergies           Medications:     Prior to Admission medications    Medication Sig Start Date End Date Taking? Authorizing Provider   paragard intrauterine copper device 1 each by Intrauterine route once.    Shira Wilkerson MD              Review of Systems:   The Review of Systems is negative other than noted in the HPI            Physical Exam:   Patient Vitals for the past 24 hrs:   BP Temp Temp src Pulse Resp SpO2 Height Weight   02/25/25 1000 127/77 -- -- 84 -- 98 % -- --   02/25/25 0914 119/70 -- -- 88 -- 97 % -- --   02/25/25 0904 114/57 -- -- 80 -- 96 % -- --   02/25/25 0844 125/55 -- -- 78 -- 95 % -- --   02/25/25 0834 127/69 -- -- 85 -- 94 % -- --   02/25/25 0814 124/66 -- -- 75 -- 98 % -- --   02/25/25 0728 103/75 -- -- 77 -- 99 % -- --   02/25/25 0715 112/71 -- -- 71 -- 99 % -- --   02/25/25 0700 114/72 -- -- 72 -- 99 % -- --   02/25/25 0639 125/77 -- -- 78 -- 99 % -- --   02/25/25 0542 -- -- -- 70 -- 99 % -- --   02/25/25 0532 -- -- -- 64 -- 98 % -- --   02/25/25 0526 -- -- -- 73 -- 98 % -- --   02/25/25 0515 118/77 -- -- 72 -- 98 % -- --   02/25/25 0500 127/81 -- -- 71 -- 99 % -- --   02/25/25 0448 122/74 -- -- 71 -- 98 % -- --   02/25/25 0436 136/58 97.3  F (36.3  C) Temporal 76 16 98 % 1.55 m (5' 1.02\") 99.8 kg (220 lb)          Intake/Output Summary (Last 24 hours) at 2/25/2025 1049  Last data filed at 2/25/2025 0716  Gross per 24 hour   Intake 100 ml   Output --   Net 100 ml      Constitutional:   awake, alert, cooperative, no apparent distress, and appears stated age       Eyes:   PERRL, conjunctiva/corneas clear, EOM's intact; no scleral edema or icterus noted        ENT:   Normocephalic, " without obvious abnormality, atraumatic, Lips, mucosa, and tongue normal        Lungs:   Normal respiratory effort, no accessory muscle use       Cardiovascular:   Regular rate and rhythm       Abdomen:   Obese, soft, non distended with severe RLQ TTP associated with involuntary guarding. + McBurney's point. Negative Rovsing sign. Transverse suprapubic incision c/w  with e/o normal healing.        Musculoskeletal:   No obvious swelling, bruising or deformity       Skin:   Skin color and texture normal for patient, no rashes or lesions              Data:         All imaging studies reviewed by me.    Results for orders placed or performed during the hospital encounter of 25 (from the past 24 hours)   [place] Draw    Narrative    The following orders were created for panel order [place] Draw.  Procedure                               Abnormality         Status                     ---------                               -----------         ------                     Extra Blue Top Tube[[id number]]                              Final result               Extra Red Top Tube[[id number]]                               Final result               Extra Green Top (Lithium...[[id number]]                      Final result               Extra Purple Top Tube[[id number]]                            Final result                 Please view results for these tests on the individual orders.   Extra Blue Top Tube   Result Value Ref Range    Hold Specimen JIC    Extra Red Top Tube   Result Value Ref Range    Hold Specimen JIC    Extra Green Top (Lithium Heparin) Tube   Result Value Ref Range    Hold Specimen JIC    Extra Purple Top Tube   Result Value Ref Range    Hold Specimen JIC    CBC with platelets differential    Narrative    The following orders were created for panel order CBC with platelets differential.  Procedure                               Abnormality         Status                     ---------                                -----------         ------                     CBC with platelets and d...[[id number]]  Abnormal            Final result                 Please view results for these tests on the individual orders.   Basic metabolic panel   Result Value Ref Range    Sodium 142 135 - 145 mmol/L    Potassium 3.6 3.4 - 5.3 mmol/L    Chloride 105 98 - 107 mmol/L    Carbon Dioxide (CO2) 25 22 - 29 mmol/L    Anion Gap 12 7 - 15 mmol/L    Urea Nitrogen 10.3 6.0 - 20.0 mg/dL    Creatinine 0.62 0.51 - 0.95 mg/dL    GFR Estimate >90 >60 mL/min/1.73m2    Calcium 9.9 8.8 - 10.4 mg/dL    Glucose 120 (H) 70 - 99 mg/dL   Hepatic function panel   Result Value Ref Range    Protein Total 7.7 6.4 - 8.3 g/dL    Albumin 4.5 3.5 - 5.2 g/dL    Bilirubin Total 0.3 <=1.2 mg/dL    Alkaline Phosphatase 132 40 - 150 U/L    AST 21 0 - 45 U/L    ALT 23 0 - 50 U/L    Bilirubin Direct 0.08 0.00 - 0.30 mg/dL   Lipase   Result Value Ref Range    Lipase 23 13 - 60 U/L   HCG QUALitative pregnancy (blood)   Result Value Ref Range    hCG Serum Qualitative Negative Negative   CBC with platelets and differential   Result Value Ref Range    WBC Count 12.5 (H) 4.0 - 11.0 10e3/uL    RBC Count 5.22 (H) 3.80 - 5.20 10e6/uL    Hemoglobin 13.6 11.7 - 15.7 g/dL    Hematocrit 42.5 35.0 - 47.0 %    MCV 81 78 - 100 fL    MCH 26.1 (L) 26.5 - 33.0 pg    MCHC 32.0 31.5 - 36.5 g/dL    RDW 14.1 10.0 - 15.0 %    Platelet Count 325 150 - 450 10e3/uL    % Neutrophils 70 %    % Lymphocytes 25 %    % Monocytes 4 %    % Eosinophils 1 %    % Basophils 0 %    % Immature Granulocytes 0 %    NRBCs per 100 WBC 0 <1 /100    Absolute Neutrophils 8.7 (H) 1.6 - 8.3 10e3/uL    Absolute Lymphocytes 3.1 0.8 - 5.3 10e3/uL    Absolute Monocytes 0.5 0.0 - 1.3 10e3/uL    Absolute Eosinophils 0.1 0.0 - 0.7 10e3/uL    Absolute Basophils 0.0 0.0 - 0.2 10e3/uL    Absolute Immature Granulocytes 0.1 <=0.4 10e3/uL    Absolute NRBCs 0.0 10e3/uL   CT Abdomen Pelvis w Contrast    Addendum: 2/25/2025     ADDENDUM:    Upon further review of the images, the thin metallic density in the anterior left pelvis is consistent with abnormal extrauterine positioning of intrauterine device (for example images 41-46 of coronal series 4). Reportedly, a similar finding was   demonstrated on a study earlier in February 2025. There is no fluid collection or significant inflammation about the IUD or near the uterus. Surgical consultation for this finding is also recommended.    I discussed the findings with Dr. Miller of the ED at 6:45 AM on 2/25/2025.    END ADDENDUM      Narrative    EXAM: CT ABDOMEN PELVIS W CONTRAST  LOCATION: [facility]  DATE: 2/25/2025    INDICATION: epgiastric and LLQ abd pain  COMPARISON: None.  TECHNIQUE: CT scan of the abdomen and pelvis was performed following injection of IV contrast. Multiplanar reformats were obtained. Dose reduction techniques were used.  CONTRAST: isovue 370 90ml    FINDINGS:   LOWER CHEST: Normal.    HEPATOBILIARY: Normal.    PANCREAS: Normal.    SPLEEN: Normal.    ADRENAL GLANDS: Normal.    KIDNEYS/BLADDER: Normal.    BOWEL: The appendix is dilated to 10 mm wide and contains predominantly fluid in the lumen. There is mild edema and inflammatory stranding of the periappendiceal fat. No free air, significant free fluid or abscess. Colonic diverticulosis. No evidence for   diverticulitis.    LYMPH NODES: Normal.    VASCULATURE: Normal.    PELVIC ORGANS: Surgical material in the anterior left pelvis.    MUSCULOSKELETAL: Normal.      Impression    IMPRESSION:   Acute uncomplicated appendicitis.    I discussed the findings with Dr. Miller of the ED at 6:20 AM on 2/25/2025.           Sue Borrero PA-C  [facility]  Surgery Clinic - [address] [address]?  Office: [phone]

## 2025-02-25 NOTE — LETTER
[facility] PACU  [address] [place] [id number]  Phone: [phone]  Fax: [phone]    February 25, 2025        Jane Rojas  [address] [place] [address]          To whom it may concern:    RE: Jane Rojas    Patient was seen and treated today at our medical facility on 02/25/2025. Please excuse her from work from 02/25/2025-03/11/2025. On 03/12/2025, she may return to work with activity as tolerated.     Please contact me for questions or concerns.      Sincerely,    Sue Borrero PA-C

## 2025-03-03 ENCOUNTER — APPOINTMENT (OUTPATIENT)
Dept: INTERPRETER SERVICES | Facility: CLINIC | Age: 30
End: 2025-03-03
Payer: COMMERCIAL

## 2025-03-03 ENCOUNTER — TELEPHONE (OUTPATIENT)
Dept: SURGERY | Facility: CLINIC | Age: 30
End: 2025-03-03
Payer: COMMERCIAL

## 2025-03-03 NOTE — TELEPHONE ENCOUNTER
[facility] Post-Op Phone Call                     Surgeon: Carroll Carver    Date of Surgery: 2/25/25  Surgery: Laparoscopic Appendectomy   Discharge Date: 2/25/25    Date/Time Called:   Date: 3/3/2025 Time: 8:47 AM   Attempt: First    Pain Control:  Intensity: Mild (1 - 3)  Duration/Location/Explain: incision that appendix came out of   What makes it better/worse? Ibuprofen     Medications:  Narcotic Use - No  Drug type: ibuprofen   Frequency: as needed     Incisions:  Drainage? clean and dry  Any fever type symptoms? No  Comment:     GI:  Nausea? No  Vomiting? No  BM? Yes  Gas? Yes  Voiding Frequency? 4 or more/day   Appetite? Fair    Activity:  Walking activity? Yes  Frequency/Type: as tolerated   Restrictions:     Return to Work Plans?  Expected date 3/12  Do you need anything from us in this regard? No     Post-op appointment made? No          Thank you for your time. Please do not hesitate to call us with any questions or concerns.    Call completed by: Jenny Rodriguez RN

## 2025-03-05 LAB
PATH REPORT.COMMENTS IMP SPEC: NORMAL
PATH REPORT.COMMENTS IMP SPEC: NORMAL
PATH REPORT.FINAL DX SPEC: NORMAL
PATH REPORT.GROSS SPEC: NORMAL
PATH REPORT.MICROSCOPIC SPEC OTHER STN: NORMAL
PATH REPORT.RELEVANT HX SPEC: NORMAL
PHOTO IMAGE: NORMAL

## 2025-03-06 ENCOUNTER — TELEPHONE (OUTPATIENT)
Dept: SURGERY | Facility: CLINIC | Age: 30
End: 2025-03-06
Payer: COMMERCIAL

## 2025-03-06 NOTE — TELEPHONE ENCOUNTER
Patient is s/p Laparoscopic Appendectomy on 2/25 with TRISTAN, calls today with questions about one of her incisions. States that one of her trocar sites has been a little irritated and itchy. Denies fever or chills, drainage from the site or any other s/s of infection. Recommended that she could try putting some bacitracin ointment on the incision to help keep the area clean and moisturized. Advised patient that if that does not help her she can call us back for further recommendations and possible have her seen in clinic. Patient verbalized understanding and will call with any other questions or concerns.    Blaire Ferrer RN  [facility]  General Surgery  [address]  [address] [place] [address]  Office:[phone]  Employed by [facility] [employer]

## 2025-03-11 ENCOUNTER — TELEPHONE (OUTPATIENT)
Dept: SURGERY | Facility: CLINIC | Age: 30
End: 2025-03-11
Payer: COMMERCIAL

## 2025-03-11 NOTE — LETTER
[facility] [address]  [facility] [address]  [phone]          March 11, 2025    RE:  Jane Marks Leon [address] [place] [address]            To whom it may concern:    Jane was seen and treated at our medical facility on 02/25/2025. Due to her ongoing recovery, she may return to work with activity as tolerated on 03/18/2025.       Sincerely,      Carroll Carver, DO

## 2025-03-11 NOTE — TELEPHONE ENCOUNTER
Patient is s/p laparoscopic appendectomy on 02/25/2025. Patient calls with  to report that she continues to have mild to moderate pain and is unable to walk or bend. She returns to work tomorrow but does not feel she is able to complete her job duties as she is a . Work note written for another week off and to RTW on 03/18/2025. Patient in agreement with plan. Letter written, printed, and placed at  for .    Rosa HOWARD   RN, BSN    [facility]  General Surgery  [address] [place] [address]  [address]  [place] [address]  Office: [phone]  Employed by [facility] [employer] [employer]

## 2025-03-12 ENCOUNTER — OFFICE VISIT (OUTPATIENT)
Dept: SURGERY | Facility: CLINIC | Age: 30
End: 2025-03-12
Payer: COMMERCIAL

## 2025-03-12 ENCOUNTER — TELEPHONE (OUTPATIENT)
Dept: SURGERY | Facility: CLINIC | Age: 30
End: 2025-03-12

## 2025-03-12 DIAGNOSIS — Z48.89 POSTOPERATIVE VISIT: Primary | ICD-10-CM

## 2025-03-12 NOTE — TELEPHONE ENCOUNTER
Patient is s/p laparoscopic appendectomy on 02/25/2025. Patient calling to report that she continues to have pain and pain has gotten worse within the last few days. She states that she has pain whenever she's walking, laughing, or coughing. She is using Ibuprofen q6h. At this time, patient is requesting a visit with provider to further evaluate her pain. Patient scheduled in [facility] 3/12/2025.    Rosa HOWARD RN, BSN    [facility]  General Surgery  [address] [place] [address]  Office: [phone]  Employed by [facility]

## 2025-03-12 NOTE — LETTER
March 12, 2025      Jane Rojas  [address] [place] [address] [address]  [place] [address]        To Whom It May Concern:    Jane Rojas was seen in our clinic and missed work. She will be able to return to work on or after March 25th, 2025.      Sincerely,      LYLE Grossman [facility] Surgery  [address] [place] [address]  [facility] [phone]    Electronically signed

## 2025-03-12 NOTE — PROGRESS NOTES
"HPI: Patient is here for follow-up of a Laparoscopic appendectomy and removal of intra-abdominal mirena IUD with Dr. Carver on 2/25/25. POD 14 today.  Postoperatively patient was discharged same day. Telephone postop 3/3 with mild pain over incision  \"where appendix was taken out.\" 3/6 called with irritated trocar site - bacitracin recommended. 3/11 called with continued mild to moderate pain, unable to walk or bend over. Scheduled to return to work 3/12 but work note written for new date.  Overall patient states that she is doing well and is eating okay, passing flatus and BMs.  She does not notice there was any inciting event before the pain started, it is always been of slight pain and comes intermittently.  Yesterday was worse than any other day.  She does not know that she lifted anything more than her son which is about 20 pounds and no events such as twisting and lifting from what she can recall.  It hurts worse when she is bending over or lying back.  She is worried about returning to work because she is a , does not have to lift heavy things but does have to bend over.  States that her surgical wound on the left side is about the area of where it hurts, a little bit above this as well.  She has not noticed any opening of the incision or drainage or erythema around this.  Has not noticed any pain on the right side.  No fevers or chills.  No nausea or vomiting.  She is eating well.    LMP  (LMP Unknown)     EXAM: Serbian professional  was used over the phone  General: Appears well, sitting upright in chair and conversant  Abdomen: Soft, non-tender, non-distended. No rebound or guarding.  Surgical wounds: Incisions healing well, clean/dry/intact without induration or drainage.  Tenderness with palpation just superior to the 12 mm port on the left lower quadrant.  Tenderness is mostly localized to around this area, expect that the superior nature of her pain is because her pannus is dragging " downwards.  Did not palpate any sort of hematoma or seroma beneath the incision site it is all soft.  No erythema.  No induration.  Other incision sites are nontender and healing well, nontender right lower quadrant or other quadrants.  No guarding or peritoneal signs on exam.    Surgical Pathology Report:  Final Diagnosis   A) RESECTED VERMIFORM APPENDIX:        -  ACUTE APPENDICITIS WITHOUT PERFORATION     B) REMOVAL OF FREE-FLOATING INTRAUTERINE DEVICE:        -  ADIPOSE TISSUE WITH NO DIAGNOSTIC ABNORMALITY        -  GROSS IDENTIFICATION: INTACT MIRENA INTRAUTERINE DEVICE     Assessment/Plan: Expect that patient's pain is from the deep vessel loop placed under the larger incision as this is noted in the op note.  Although she does not have an inciting event, the pain is mostly positional and is not intra-abdominal at this point from palpation in her exam.  There is no infectious signs or symptoms that would warrant imaging at this point.  Explained the procedure to the patient and the placement of the vessel loop, she is understanding and it does make sense to her via .    Recommend that patient continue to be careful with lifting heavy things or heavier than her child.  Refrain from twisting and lifting.  Explained that patient can place ice over the incision or if it is more of a cramping muscle ache can place heat over this to help relieve the pain.  Explained she could alternate Tylenol and ibuprofen for a few days as needed.  Will refrain from any muscle relaxer at this point as she has a child to care for and is alone at home, if she does call back and the pain is worse could consider this.    Discussed with patient return precautions such as but not limited to: Fever or chills, erythema or induration surrounding any incision site, new severe abdominal pain, inability to tolerate p.o. intake, inability to pass flatus or stool with bloating.    I wrote the patient a new work letter that explains  she needs a little bit more time off due to discomfort.  Gave it to her in print in the clinic.    LYLE Grossman [facility] Surgery  [facility], [address]  [place] [address]  Office [phone]

## (undated) DEVICE — SU MONOCRYL+ 4-0 18IN PS2 UND MCP496G

## (undated) DEVICE — ENDO TROCAR FIRST ENTRY KII FIOS Z-THRD 05X100MM CTF03

## (undated) DEVICE — CLIP LIGACLIP LG YELLOW LT400

## (undated) DEVICE — SU VICRYL+ 0 27 UR6 VLT VCP603H

## (undated) DEVICE — CUSTOM PACK LAP CHOLE SBA5BLCHEA

## (undated) DEVICE — TUBING SMOKE EVAC PNEUMOCLEAR HIGH FLOW 0620050250

## (undated) DEVICE — ENDO TROCAR FIRST ENTRY KII FIOS Z-THRD 12X100MM CTF73

## (undated) DEVICE — SUCTION MANIFOLD NEPTUNE 2 SYS 1 PORT 702-025-000

## (undated) DEVICE — STPL ENDO LINEAR CUT ARTICULATING 45MM ATS45

## (undated) DEVICE — BASIN EMESIS STERILE  SSK9005A

## (undated) DEVICE — ENDO SHEARS RENEW LAP ENDOCUT SCISSOR TIP 16.5MM 3142

## (undated) DEVICE — STPL ENDO RELOAD 45X3.5MM 6R45B

## (undated) DEVICE — STPL ENDO RELOAD 45X2.5MM VASC TR45W

## (undated) DEVICE — ESU GROUND PAD ADULT REM W/15' CORD E7507DB

## (undated) DEVICE — PREP CHLORAPREP 26ML TINTED HI-LITE ORANGE 930815

## (undated) DEVICE — GLOVE BIOGEL PI ORTHOPRO SZ 7.5 47675

## (undated) DEVICE — BLADE KNIFE SURG 11 371111

## (undated) DEVICE — NDL INSUFFLATION 13GA 120MM C2201

## (undated) DEVICE — VIAL DECANTER STERILE WHITE DYNJDEC06

## (undated) DEVICE — ENDO TROCAR SLEEVE KII Z-THREADED 05X100MM CTS02

## (undated) DEVICE — Device

## (undated) DEVICE — SOL WATER IRRIG 1000ML BOTTLE 2F7114

## (undated) DEVICE — ENDO POUCH UNIV RETRIEVAL SYSTEM INZII 10MM CD001

## (undated) RX ORDER — FENTANYL CITRATE 50 UG/ML
INJECTION, SOLUTION INTRAMUSCULAR; INTRAVENOUS
Status: DISPENSED
Start: 2025-02-25

## (undated) RX ORDER — CEFAZOLIN SODIUM 1 G/3ML
INJECTION, POWDER, FOR SOLUTION INTRAMUSCULAR; INTRAVENOUS
Status: DISPENSED
Start: 2025-02-25

## (undated) RX ORDER — BUPIVACAINE HYDROCHLORIDE 5 MG/ML
INJECTION, SOLUTION PERINEURAL
Status: DISPENSED
Start: 2025-02-25

## (undated) RX ORDER — BUPIVACAINE HYDROCHLORIDE 2.5 MG/ML
INJECTION, SOLUTION INFILTRATION; PERINEURAL
Status: DISPENSED
Start: 2025-02-25

## (undated) RX ORDER — ALBUTEROL SULFATE 90 UG/1
INHALANT RESPIRATORY (INHALATION)
Status: DISPENSED
Start: 2025-02-25